# Patient Record
Sex: MALE | Race: WHITE | NOT HISPANIC OR LATINO | Employment: FULL TIME | URBAN - METROPOLITAN AREA
[De-identification: names, ages, dates, MRNs, and addresses within clinical notes are randomized per-mention and may not be internally consistent; named-entity substitution may affect disease eponyms.]

---

## 2022-07-06 ENCOUNTER — OFFICE VISIT (OUTPATIENT)
Dept: INTERNAL MEDICINE | Facility: CLINIC | Age: 47
End: 2022-07-06

## 2022-07-06 ENCOUNTER — PATIENT ROUNDING (BHMG ONLY) (OUTPATIENT)
Dept: INTERNAL MEDICINE | Facility: CLINIC | Age: 47
End: 2022-07-06

## 2022-07-06 VITALS
HEART RATE: 92 BPM | HEIGHT: 72 IN | BODY MASS INDEX: 22.62 KG/M2 | SYSTOLIC BLOOD PRESSURE: 114 MMHG | WEIGHT: 167 LBS | DIASTOLIC BLOOD PRESSURE: 82 MMHG | TEMPERATURE: 97.8 F | OXYGEN SATURATION: 97 %

## 2022-07-06 DIAGNOSIS — F17.210 CIGARETTE NICOTINE DEPENDENCE WITHOUT COMPLICATION: ICD-10-CM

## 2022-07-06 DIAGNOSIS — Z00.00 HEALTH CARE MAINTENANCE: Primary | ICD-10-CM

## 2022-07-06 DIAGNOSIS — R10.11 RUQ ABDOMINAL PAIN: ICD-10-CM

## 2022-07-06 DIAGNOSIS — F41.9 ANXIETY: ICD-10-CM

## 2022-07-06 DIAGNOSIS — K76.9 LIVER DISEASE: ICD-10-CM

## 2022-07-06 PROBLEM — F17.200 NICOTINE ADDICTION: Status: ACTIVE | Noted: 2022-07-06

## 2022-07-06 PROCEDURE — 99386 PREV VISIT NEW AGE 40-64: CPT | Performed by: NURSE PRACTITIONER

## 2022-07-06 RX ORDER — BUPROPION HYDROCHLORIDE 150 MG/1
150 TABLET, EXTENDED RELEASE ORAL 2 TIMES DAILY
Qty: 60 TABLET | Refills: 5 | Status: SHIPPED | OUTPATIENT
Start: 2022-07-06

## 2022-07-06 NOTE — PROGRESS NOTES
July 6, 2022    Patient Rounding obtained at checkout. Patient heard about us from his family. Stated he did not have any problems scheduling his new patient appointment. Patient stated his first appointment went very weill and he would recommend us to family and friends.

## 2022-07-06 NOTE — PATIENT INSTRUCTIONS
1. Preventative counseling- work on healthy diet and exercise   2. Current smoker/nicotine use- needs NRT and will try wellbutrin XL  3. Anxiety /hx of ETOH abuse- will help pt set up with a therapist, discuss sex with them, f/ u in 4 weeks. Quit smoking after 1 week.   4. Liver disease- has not seen GI specialist in about 5-6 years   5. RUQ pain- check US and liver labs

## 2022-07-06 NOTE — PROGRESS NOTES
Subjective   Jesús Ferrer is a 46 y.o. male. New patient  Stomach pain     History of Present Illness   The patient is here today for CPE and lab work F/U. New patient to establish care.    Acute abd pain. Pain started about 3 months ago. It hurts in his RUQ. Its intermittent, non tender to palpation. It can be sharp and radiates to his lateral side. No GERD. The pain occurs sometimes if he is dehydrated, otherwise he is unsure.    He also smokes pot daily to deal anxiety, anger, PTSD. He doesn't want to do that anymore. He had alcoholism and homelessness for years. He reports he was in a coma for a few weeks, had organ failure. His parents planned his . He woke up out of nowhere. He had to learn to walk and talk again. He was down to 115 lbs he thinks. He doesn't remember a lot of this. He went to a  facility to rehab. He still didn't quit drinking. The last time he shot up heroin and OD'd at a [facility]arc stop. He then went to a medical detox and has never done anything since. Was there for over a year. Marijuana started about 1-2 years ago.     He is a  at [employer].     Needs help quitting smoking. He wants to quit tomorrow.     He has not been sexually active for a year. He is able to masturbate and achieve orgasm but is now anxious that he will be unable to perform.     Kids are 8 and 9 (these are not biological kids but his girlfriend's kids), about to be 8 and 10. His oldest dtr is special needs, autistic. She is able to talk now.   His son is 17 years old and lives in [place] with his ex wife.   Sisters are taisha, and ian Ferrer, IBRAHIMA Young Goodwin.   The following portions of the patient's history were reviewed and updated as appropriate: allergies, current medications, past family history, past medical history, past social history, past surgical history and problem list.    Review of Systems   Constitutional: Negative for chills and fever.   HENT: Negative for ear  pain, rhinorrhea and sore throat.    Eyes: Negative.    Respiratory: Negative for cough and shortness of breath.    Cardiovascular: Negative.    Gastrointestinal: Positive for abdominal pain.   Endocrine: Negative for cold intolerance and heat intolerance.   Genitourinary: Negative for decreased urine volume, difficulty urinating, discharge, dysuria, flank pain, frequency, genital sores, hematuria, penile pain, erectile dysfunction, testicular pain and urgency.   Musculoskeletal: Positive for arthralgias.   Skin: Positive for rash (on flexor surfaces, pt reports as a child sensitive skin ).   Allergic/Immunologic: Negative for environmental allergies and food allergies.   Neurological: Negative.    Hematological: Negative.    Psychiatric/Behavioral: Positive for depressed mood. Negative for dysphoric mood and suicidal ideas. The patient is nervous/anxious.        Objective   Physical Exam  Constitutional:       Appearance: Normal appearance. He is well-developed.   HENT:      Right Ear: Hearing, tympanic membrane, ear canal and external ear normal.      Left Ear: Hearing, tympanic membrane, ear canal and external ear normal.      Nose: Nose normal.      Mouth/Throat:      Pharynx: Uvula midline.   Eyes:      General: Lids are normal.      Conjunctiva/sclera: Conjunctivae normal.      Pupils: Pupils are equal, round, and reactive to light.   Neck:      Thyroid: No thyromegaly.      Vascular: No carotid bruit.      Trachea: Trachea normal.   Cardiovascular:      Rate and Rhythm: Normal rate and regular rhythm.      Heart sounds: Normal heart sounds.   Pulmonary:      Effort: Pulmonary effort is normal.      Breath sounds: Normal breath sounds.      Comments: Coarse upper lung sounds, cleared with coughing   Chest:   Breasts:      Right: No supraclavicular adenopathy.      Left: No supraclavicular adenopathy.       Abdominal:      General: Abdomen is flat. Bowel sounds are normal.      Palpations: Abdomen is soft.       Tenderness: There is no abdominal tenderness.   Musculoskeletal:         General: Normal range of motion.      Cervical back: Normal range of motion and neck supple.   Lymphadenopathy:      Cervical: No cervical adenopathy.      Upper Body:      Right upper body: No supraclavicular adenopathy.      Left upper body: No supraclavicular adenopathy.   Skin:     General: Skin is warm and dry.   Neurological:      Mental Status: He is alert and oriented to person, place, and time.      Cranial Nerves: No cranial nerve deficit.      Sensory: No sensory deficit.      Deep Tendon Reflexes:      Reflex Scores:       Patellar reflexes are 2+ on the right side and 2+ on the left side.  Psychiatric:         Speech: Speech normal.         Behavior: Behavior normal.         Thought Content: Thought content normal.         Judgment: Judgment normal.         Vitals:    07/06/22 0913   BP: 114/82   Pulse: 92   Temp: 97.8 °F (36.6 °C)   SpO2: 97%     Body mass index is 22.65 kg/m².      Assessment & Plan   Diagnoses and all orders for this visit:    1. Health care maintenance (Primary)  -     CBC & Differential  -     Comprehensive Metabolic Panel  -     Lipid Panel With LDL / HDL Ratio  -     TSH Rfx On Abnormal To Free T4  -     Vitamin D 25 Hydroxy  -     Hepatitis C Antibody    2. Cigarette nicotine dependence without complication  -     buPROPion SR (Wellbutrin SR) 150 MG 12 hr tablet; Take 1 tablet by mouth 2 (Two) Times a Day.  Dispense: 60 tablet; Refill: 5  -     CBC & Differential  -     Comprehensive Metabolic Panel  -     Lipid Panel With LDL / HDL Ratio  -     TSH Rfx On Abnormal To Free T4  -     Vitamin D 25 Hydroxy  -     Hepatitis C Antibody    3. Anxiety  -     buPROPion SR (Wellbutrin SR) 150 MG 12 hr tablet; Take 1 tablet by mouth 2 (Two) Times a Day.  Dispense: 60 tablet; Refill: 5  -     CBC & Differential  -     Comprehensive Metabolic Panel  -     Lipid Panel With LDL / HDL Ratio  -     TSH Rfx On Abnormal To  Free T4  -     Vitamin D 25 Hydroxy  -     Hepatitis C Antibody    4. Liver disease  -     CBC & Differential  -     Comprehensive Metabolic Panel  -     Lipid Panel With LDL / HDL Ratio  -     TSH Rfx On Abnormal To Free T4  -     Vitamin D 25 Hydroxy  -     Hepatitis C Antibody  -     US Liver; Future    5. RUQ abdominal pain  -     CBC & Differential  -     Comprehensive Metabolic Panel  -     Lipid Panel With LDL / HDL Ratio  -     TSH Rfx On Abnormal To Free T4  -     Vitamin D 25 Hydroxy  -     Hepatitis C Antibody  -     US Liver; Future                 1. Preventative counseling- work on healthy diet and exercise   2. Current smoker/nicotine use- needs NRT and will try wellbutrin XL  3. Anxiety /hx of ETOH abuse- will help pt set up with a therapist, discuss sex with them, f/ u in 4 weeks. Quit smoking after 1 week.   4. Liver disease- has not seen GI specialist in about 5-6 years   5. RUQ pain- check US and liver labs

## 2022-07-08 LAB
25(OH)D3+25(OH)D2 SERPL-MCNC: 34.1 NG/ML (ref 30–100)
ALBUMIN SERPL-MCNC: 4.5 G/DL (ref 4–5)
ALBUMIN/GLOB SERPL: 1.9 {RATIO} (ref 1.2–2.2)
ALP SERPL-CCNC: 46 IU/L (ref 44–121)
ALT SERPL-CCNC: 26 IU/L (ref 0–44)
AST SERPL-CCNC: 34 IU/L (ref 0–40)
BASOPHILS # BLD AUTO: 0 X10E3/UL (ref 0–0.2)
BASOPHILS NFR BLD AUTO: 0 %
BILIRUB SERPL-MCNC: <0.2 MG/DL (ref 0–1.2)
BUN SERPL-MCNC: 14 MG/DL (ref 6–24)
BUN/CREAT SERPL: 19 (ref 9–20)
CALCIUM SERPL-MCNC: 9.2 MG/DL (ref 8.7–10.2)
CHLORIDE SERPL-SCNC: 108 MMOL/L (ref 96–106)
CHOLEST SERPL-MCNC: 156 MG/DL (ref 100–199)
CO2 SERPL-SCNC: 23 MMOL/L (ref 20–29)
CREAT SERPL-MCNC: 0.73 MG/DL (ref 0.76–1.27)
EGFRCR SERPLBLD CKD-EPI 2021: 114 ML/MIN/1.73
EOSINOPHIL # BLD AUTO: 0.6 X10E3/UL (ref 0–0.4)
EOSINOPHIL NFR BLD AUTO: 5 %
ERYTHROCYTE [DISTWIDTH] IN BLOOD BY AUTOMATED COUNT: 13.2 % (ref 11.6–15.4)
GLOBULIN SER CALC-MCNC: 2.4 G/DL (ref 1.5–4.5)
GLUCOSE SERPL-MCNC: 101 MG/DL (ref 65–99)
HCT VFR BLD AUTO: 40.5 % (ref 37.5–51)
HCV AB S/CO SERPL IA: 0.1 S/CO RATIO (ref 0–0.9)
HDLC SERPL-MCNC: 42 MG/DL
HGB BLD-MCNC: 13.4 G/DL (ref 13–17.7)
IMM GRANULOCYTES # BLD AUTO: 0 X10E3/UL (ref 0–0.1)
IMM GRANULOCYTES NFR BLD AUTO: 0 %
LDLC SERPL CALC-MCNC: 100 MG/DL (ref 0–99)
LDLC/HDLC SERPL: 2.4 RATIO (ref 0–3.6)
LYMPHOCYTES # BLD AUTO: 3.2 X10E3/UL (ref 0.7–3.1)
LYMPHOCYTES NFR BLD AUTO: 27 %
MCH RBC QN AUTO: 30.5 PG (ref 26.6–33)
MCHC RBC AUTO-ENTMCNC: 33.1 G/DL (ref 31.5–35.7)
MCV RBC AUTO: 92 FL (ref 79–97)
MONOCYTES # BLD AUTO: 1 X10E3/UL (ref 0.1–0.9)
MONOCYTES NFR BLD AUTO: 8 %
NEUTROPHILS # BLD AUTO: 7.1 X10E3/UL (ref 1.4–7)
NEUTROPHILS NFR BLD AUTO: 60 %
PLATELET # BLD AUTO: 397 X10E3/UL (ref 150–450)
POTASSIUM SERPL-SCNC: 4.6 MMOL/L (ref 3.5–5.2)
PROT SERPL-MCNC: 6.9 G/DL (ref 6–8.5)
RBC # BLD AUTO: 4.4 X10E6/UL (ref 4.14–5.8)
SODIUM SERPL-SCNC: 143 MMOL/L (ref 134–144)
TRIGL SERPL-MCNC: 73 MG/DL (ref 0–149)
TSH SERPL DL<=0.005 MIU/L-ACNC: 1.2 UIU/ML (ref 0.45–4.5)
VLDLC SERPL CALC-MCNC: 14 MG/DL (ref 5–40)
WBC # BLD AUTO: 12 X10E3/UL (ref 3.4–10.8)

## 2022-07-27 ENCOUNTER — HOSPITAL ENCOUNTER (OUTPATIENT)
Dept: ULTRASOUND IMAGING | Facility: HOSPITAL | Age: 47
Discharge: HOME | End: 2022-07-27
Admitting: NURSE PRACTITIONER

## 2022-07-27 DIAGNOSIS — R10.11 RUQ ABDOMINAL PAIN: ICD-10-CM

## 2022-07-27 DIAGNOSIS — K76.9 LIVER DISEASE: ICD-10-CM

## 2022-07-27 PROCEDURE — 76705 ECHO EXAM OF ABDOMEN: CPT

## 2022-08-03 ENCOUNTER — OFFICE VISIT (OUTPATIENT)
Dept: INTERNAL MEDICINE | Facility: CLINIC | Age: 47
End: 2022-08-03

## 2022-08-03 VITALS
OXYGEN SATURATION: 97 % | HEIGHT: 72 IN | WEIGHT: 166.8 LBS | DIASTOLIC BLOOD PRESSURE: 68 MMHG | SYSTOLIC BLOOD PRESSURE: 112 MMHG | TEMPERATURE: 98.2 F | HEART RATE: 67 BPM | BODY MASS INDEX: 22.59 KG/M2

## 2022-08-03 DIAGNOSIS — R10.11 RUQ ABDOMINAL PAIN: ICD-10-CM

## 2022-08-03 DIAGNOSIS — M79.674 PAIN OF TOE OF RIGHT FOOT: ICD-10-CM

## 2022-08-03 DIAGNOSIS — D72.829 LEUKOCYTOSIS, UNSPECIFIED TYPE: Primary | ICD-10-CM

## 2022-08-03 DIAGNOSIS — Z12.11 COLON CANCER SCREENING: ICD-10-CM

## 2022-08-03 DIAGNOSIS — F41.9 ANXIETY: ICD-10-CM

## 2022-08-03 DIAGNOSIS — F17.200 CURRENT EVERY DAY SMOKER: ICD-10-CM

## 2022-08-03 DIAGNOSIS — Z12.2 SCREENING FOR LUNG CANCER: ICD-10-CM

## 2022-08-03 PROCEDURE — 90471 IMMUNIZATION ADMIN: CPT | Performed by: NURSE PRACTITIONER

## 2022-08-03 PROCEDURE — 99214 OFFICE O/P EST MOD 30 MIN: CPT | Performed by: NURSE PRACTITIONER

## 2022-08-03 PROCEDURE — 90677 PCV20 VACCINE IM: CPT | Performed by: NURSE PRACTITIONER

## 2022-08-03 RX ORDER — BUSPIRONE HYDROCHLORIDE 5 MG/1
5 TABLET ORAL 2 TIMES DAILY PRN
Qty: 60 TABLET | Refills: 5 | Status: SHIPPED | OUTPATIENT
Start: 2022-08-03 | End: 2022-08-18 | Stop reason: SDUPTHER

## 2022-08-03 NOTE — PROGRESS NOTES
"Subjective   Jesús Ferrer is a 47 y.o. male.  4 week fu on anxiety     History of Present Illness   The patient is here today to F/U on lab work.     Abd pain- US neg acute, fatty liver noted, the pain continues, it is sporadic. Occurs every few days, may last for a few minutes. He is very active and wonders if it is a muscle tear. It has improved some. He is eating healthier and drinking more water.     Anxiety- pt was started on wellbutrin at last visit. He can let this go a little easier. He still does feel anxious. He has not set up a visit with a therapist.     He had alcoholism and homelessness for years. He reports he was in a coma for a few weeks, had organ failure. His parents planned his . He woke up out of nowhere. He had to learn to walk and talk again. He was down to 115 lbs he thinks. He doesn't remember a lot of this. He went to a  facility to rehab. He still didn't quit drinking. The last time he shot up heroin and OD'd at a [employer] stop. He then went to a medical detox and has never done anything since. Was there for over a year. Marijuana started about 1-2 years ago.     His right toe \"hurts like hell all the time\". It has been going on for years. He wonders if he injured it many years ago with sports. It will swell at times \"a little\" , does not turn red.      He is a  at [employer].   Kids are 8 and 9 (these are not biological kids but his girlfriend's kids), about to be 8 and 10. His oldest dtr is special needs, autistic. She is able to talk now.   His son is 17 years old and lives in [place] with his ex wife.   Sisters are Anaya, and ian Ferrer, IBRAHIMA Vidal Buddy.   The following portions of the patient's history were reviewed and updated as appropriate: allergies, current medications, past family history, past medical history, past social history, past surgical history and problem list.    Review of Systems   Constitutional: Negative for chills and " fever.   Respiratory: Negative.    Cardiovascular: Negative.    Gastrointestinal: Positive for abdominal pain. Negative for abdominal distention, anal bleeding, blood in stool, constipation, diarrhea, nausea, rectal pain, vomiting, GERD and indigestion.   Musculoskeletal: Positive for arthralgias.   Psychiatric/Behavioral: Negative for dysphoric mood and suicidal ideas. The patient is nervous/anxious.        Objective   Physical Exam  Constitutional:       Appearance: Normal appearance. He is well-developed.   Neck:      Thyroid: No thyromegaly.   Cardiovascular:      Rate and Rhythm: Normal rate and regular rhythm.      Heart sounds: Normal heart sounds.   Pulmonary:      Effort: Pulmonary effort is normal.      Breath sounds: Normal breath sounds.   Abdominal:      General: Abdomen is flat.      Palpations: Abdomen is soft.      Tenderness: There is no abdominal tenderness.   Musculoskeletal:      Cervical back: Normal range of motion and neck supple.      Right foot: Normal range of motion and normal capillary refill. Bony tenderness present. Normal pulse.        Legs:       Comments: Right great toe Hallux valgus, tender to palpation, slight erythema noted.    Lymphadenopathy:      Cervical: No cervical adenopathy.   Skin:     General: Skin is warm and dry.   Neurological:      Mental Status: He is alert.   Psychiatric:         Behavior: Behavior normal.         Thought Content: Thought content normal.         Judgment: Judgment normal.         Vitals:    08/03/22 0931   BP: 112/68   Pulse: 67   Temp: 98.2 °F (36.8 °C)   SpO2: 97%     Body mass index is 22.62 kg/m².      Current Outpatient Medications:   •  buPROPion SR (Wellbutrin SR) 150 MG 12 hr tablet, Take 1 tablet by mouth 2 (Two) Times a Day., Disp: 60 tablet, Rfl: 5  •  busPIRone (BUSPAR) 5 MG tablet, Take 1 tablet by mouth 2 (Two) Times a Day As Needed (anxiety)., Disp: 60 tablet, Rfl: 5  Assessment & Plan   Diagnoses and all orders for this  visit:    1. Leukocytosis, unspecified type (Primary)  -     Ambulatory Referral to Hematology    2. Colon cancer screening  -     Ambulatory Referral to Gastroenterology    3. RUQ abdominal pain  -     Ambulatory Referral to Gastroenterology    4. Anxiety  -     busPIRone (BUSPAR) 5 MG tablet; Take 1 tablet by mouth 2 (Two) Times a Day As Needed (anxiety).  Dispense: 60 tablet; Refill: 5    5. Pain of toe of right foot  -     Uric acid  -     C-reactive protein  -     Ambulatory Referral to Podiatry    6. Screening for lung cancer  -     CT Chest Low Dose Wo; Future    7. Current every day smoker  -     CT Chest Low Dose Wo; Future        Office Visit on 07/06/2022   Component Date Value Ref Range Status   • WBC 07/07/2022 12.0 (A) 3.4 - 10.8 x10E3/uL Final   • RBC 07/07/2022 4.40  4.14 - 5.80 x10E6/uL Final   • Hemoglobin 07/07/2022 13.4  13.0 - 17.7 g/dL Final   • Hematocrit 07/07/2022 40.5  37.5 - 51.0 % Final   • MCV 07/07/2022 92  79 - 97 fL Final   • MCH 07/07/2022 30.5  26.6 - 33.0 pg Final   • MCHC 07/07/2022 33.1  31.5 - 35.7 g/dL Final   • RDW 07/07/2022 13.2  11.6 - 15.4 % Final   • Platelets 07/07/2022 397  150 - 450 x10E3/uL Final   • Neutrophil Rel % 07/07/2022 60  Not Estab. % Final   • Lymphocyte Rel % 07/07/2022 27  Not Estab. % Final   • Monocyte Rel % 07/07/2022 8  Not Estab. % Final   • Eosinophil Rel % 07/07/2022 5  Not Estab. % Final   • Basophil Rel % 07/07/2022 0  Not Estab. % Final   • Neutrophils Absolute 07/07/2022 7.1 (A) 1.4 - 7.0 x10E3/uL Final   • Lymphocytes Absolute 07/07/2022 3.2 (A) 0.7 - 3.1 x10E3/uL Final   • Monocytes Absolute 07/07/2022 1.0 (A) 0.1 - 0.9 x10E3/uL Final   • Eosinophils Absolute 07/07/2022 0.6 (A) 0.0 - 0.4 x10E3/uL Final   • Basophils Absolute 07/07/2022 0.0  0.0 - 0.2 x10E3/uL Final   • Immature Granulocyte Rel % 07/07/2022 0  Not Estab. % Final   • Immature Grans Absolute 07/07/2022 0.0  0.0 - 0.1 x10E3/uL Final   • Glucose 07/07/2022 101 (A) 65 - 99 mg/dL  Final   • BUN 07/07/2022 14  6 - 24 mg/dL Final   • Creatinine 07/07/2022 0.73 (A) 0.76 - 1.27 mg/dL Final   • EGFR Result 07/07/2022 114  >59 mL/min/1.73 Final   • BUN/Creatinine Ratio 07/07/2022 19  9 - 20 Final   • Sodium 07/07/2022 143  134 - 144 mmol/L Final   • Potassium 07/07/2022 4.6  3.5 - 5.2 mmol/L Final   • Chloride 07/07/2022 108 (A) 96 - 106 mmol/L Final   • Total CO2 07/07/2022 23  20 - 29 mmol/L Final   • Calcium 07/07/2022 9.2  8.7 - 10.2 mg/dL Final   • Total Protein 07/07/2022 6.9  6.0 - 8.5 g/dL Final   • Albumin 07/07/2022 4.5  4.0 - 5.0 g/dL Final   • Globulin 07/07/2022 2.4  1.5 - 4.5 g/dL Final   • A/G Ratio 07/07/2022 1.9  1.2 - 2.2 Final   • Total Bilirubin 07/07/2022 <0.2  0.0 - 1.2 mg/dL Final   • Alkaline Phosphatase 07/07/2022 46  44 - 121 IU/L Final   • AST (SGOT) 07/07/2022 34  0 - 40 IU/L Final   • ALT (SGPT) 07/07/2022 26  0 - 44 IU/L Final   • Total Cholesterol 07/07/2022 156  100 - 199 mg/dL Final   • Triglycerides 07/07/2022 73  0 - 149 mg/dL Final   • HDL Cholesterol 07/07/2022 42  >39 mg/dL Final   • VLDL Cholesterol Moo 07/07/2022 14  5 - 40 mg/dL Final   • LDL Chol Calc (NIH) 07/07/2022 100 (A) 0 - 99 mg/dL Final   • LDL/HDL RATIO 07/07/2022 2.4  0.0 - 3.6 ratio Final    Comment:                                     LDL/HDL Ratio                                              Men  Women                                1/2 Avg.Risk  1.0    1.5                                    Avg.Risk  3.6    3.2                                 2X Avg.Risk  6.2    5.0                                 3X Avg.Risk  8.0    6.1     • TSH 07/07/2022 1.200  0.450 - 4.500 uIU/mL Final   • 25 Hydroxy, Vitamin D 07/07/2022 34.1  30.0 - 100.0 ng/mL Final    Comment: Vitamin D deficiency has been defined by the [place] and an Endocrine Society practice guideline as a  level of serum 25-OH vitamin D less than 20 ng/mL (1,2).  The Endocrine Society went on to further define vitamin  D  insufficiency as a level between 21 and 29 ng/mL (2).  1. IOM ([place] of Medicine). 2010. Dietary reference     intakes for calcium and D. [place]: The     National Academies Press.  2. Elena ESPINO, Rafy SALINAS, Emre DOMÍNGUEZ, et al.     Evaluation, treatment, and prevention of vitamin D     deficiency: an Endocrine Society clinical practice     guideline. JCEM. 2011 Jul; 96(7):1911-30.     • Hep C Virus Ab 07/07/2022 0.1  0.0 - 0.9 s/co ratio Final    Comment:                                   Negative:     < 0.8                               Indeterminate: 0.8 - 0.9                                    Positive:     > 0.9   HCV antibody alone does not differentiate between   previous resolved infection and active infection.   The CDC and current clinical guidelines recommend   that a positive HCV antibody result be followed up   with an HCV RNA test to support the diagnosis of   acute HCV infection. [facility] offers Hepatitis C   Virus (HCV) RNA, Diagnosis, ARMANDO (247654) and   Hepatitis C Virus (HCV) Antibody with reflex to   Quantitative Real-time PCR (894376).            1. Leukocytosis- likely due to smoking, suggest he see hematology   2. Anxiety- continue wellbutrin, ok to try buspar, suggest therapist and exercise   4. Toe pain/Hallux valgus- check uric acid, see podiatrist   5. RUQ pain- GB with minimal sludge and mild fatty liver, exam benign today, see GI, could do HIDA but not with a lot of other GI symptoms, he will discuss with GI spec  6. Lung cancer screening- discussed options, he would like screening     “Discussed risks/benefits to vaccination, reviewed components of the vaccine, discussed VIS, discussed informed consent, informed consent obtained. Patient/Parent was allowed to accept or refuse vaccine. Questions answered to satisfactory state of patient/Parent. We reviewed typical age appropriate and seasonally appropriate vaccinations. Reviewed immunization history and updated state  vaccination form as needed. Patient was counseled on Prevnar 20

## 2022-08-03 NOTE — PATIENT INSTRUCTIONS
1. Leukocytosis- likely due to smoking, suggest he see hematology   2. Anxiety- continue wellbutrin, ok to try buspar, suggest therapist and exercise   4. Toe pain/Hallux valgus- check uric acid, see podiatrist   5. RUQ pain- GB with minimal sludge and mild fatty liver, exam benign today, see GI, could do HIDA but not with a lot of other GI symptoms, he will discuss with GI spec  6. Lung cancer screening- discussed options, he would like screening

## 2022-08-09 ENCOUNTER — TELEPHONE (OUTPATIENT)
Dept: INTERNAL MEDICINE | Facility: CLINIC | Age: 47
End: 2022-08-09

## 2022-08-18 ENCOUNTER — CONSULT (OUTPATIENT)
Dept: ONCOLOGY | Facility: CLINIC | Age: 47
End: 2022-08-18

## 2022-08-18 ENCOUNTER — LAB (OUTPATIENT)
Dept: OTHER | Facility: HOSPITAL | Age: 47
End: 2022-08-18

## 2022-08-18 VITALS
HEART RATE: 55 BPM | OXYGEN SATURATION: 98 % | TEMPERATURE: 97.1 F | HEIGHT: 72 IN | SYSTOLIC BLOOD PRESSURE: 125 MMHG | RESPIRATION RATE: 16 BRPM | DIASTOLIC BLOOD PRESSURE: 78 MMHG | WEIGHT: 168.3 LBS | BODY MASS INDEX: 22.8 KG/M2

## 2022-08-18 DIAGNOSIS — F41.9 ANXIETY: ICD-10-CM

## 2022-08-18 DIAGNOSIS — Z12.11 SCREENING FOR COLON CANCER: ICD-10-CM

## 2022-08-18 DIAGNOSIS — D72.829 LEUKOCYTOSIS, UNSPECIFIED TYPE: Primary | ICD-10-CM

## 2022-08-18 DIAGNOSIS — F17.200 CURRENT EVERY DAY SMOKER: ICD-10-CM

## 2022-08-18 LAB
BASOPHILS # BLD AUTO: 0.06 10*3/MM3 (ref 0–0.2)
BASOPHILS NFR BLD AUTO: 0.4 % (ref 0–1.5)
CRP SERPL-MCNC: <0.3 MG/DL (ref 0–0.5)
DEPRECATED RDW RBC AUTO: 46 FL (ref 37–54)
EOSINOPHIL # BLD AUTO: 0.44 10*3/MM3 (ref 0–0.4)
EOSINOPHIL NFR BLD AUTO: 2.9 % (ref 0.3–6.2)
ERYTHROCYTE [DISTWIDTH] IN BLOOD BY AUTOMATED COUNT: 13.6 % (ref 12.3–15.4)
ERYTHROCYTE [SEDIMENTATION RATE] IN BLOOD: 5 MM/HR (ref 0–15)
HCT VFR BLD AUTO: 39.5 % (ref 37.5–51)
HGB BLD-MCNC: 13.1 G/DL (ref 13–17.7)
IMM GRANULOCYTES # BLD AUTO: 0.06 10*3/MM3 (ref 0–0.05)
IMM GRANULOCYTES NFR BLD AUTO: 0.4 % (ref 0–0.5)
LYMPHOCYTES # BLD AUTO: 3.69 10*3/MM3 (ref 0.7–3.1)
LYMPHOCYTES NFR BLD AUTO: 24.5 % (ref 19.6–45.3)
MCH RBC QN AUTO: 30.5 PG (ref 26.6–33)
MCHC RBC AUTO-ENTMCNC: 33.2 G/DL (ref 31.5–35.7)
MCV RBC AUTO: 92.1 FL (ref 79–97)
MONOCYTES # BLD AUTO: 1.14 10*3/MM3 (ref 0.1–0.9)
MONOCYTES NFR BLD AUTO: 7.6 % (ref 5–12)
NEUTROPHILS NFR BLD AUTO: 64.2 % (ref 42.7–76)
NEUTROPHILS NFR BLD AUTO: 9.7 10*3/MM3 (ref 1.7–7)
NRBC BLD AUTO-RTO: 0 /100 WBC (ref 0–0.2)
PLATELET # BLD AUTO: 246 10*3/MM3 (ref 140–450)
PMV BLD AUTO: 11.4 FL (ref 6–12)
RBC # BLD AUTO: 4.29 10*6/MM3 (ref 4.14–5.8)
WBC NRBC COR # BLD: 15.09 10*3/MM3 (ref 3.4–10.8)

## 2022-08-18 PROCEDURE — 85652 RBC SED RATE AUTOMATED: CPT | Performed by: INTERNAL MEDICINE

## 2022-08-18 PROCEDURE — 88185 FLOWCYTOMETRY/TC ADD-ON: CPT

## 2022-08-18 PROCEDURE — 85025 COMPLETE CBC W/AUTO DIFF WBC: CPT | Performed by: INTERNAL MEDICINE

## 2022-08-18 PROCEDURE — 88184 FLOWCYTOMETRY/ TC 1 MARKER: CPT

## 2022-08-18 PROCEDURE — 99204 OFFICE O/P NEW MOD 45 MIN: CPT | Performed by: INTERNAL MEDICINE

## 2022-08-18 PROCEDURE — 88182 CELL MARKER STUDY: CPT

## 2022-08-18 PROCEDURE — 36415 COLL VENOUS BLD VENIPUNCTURE: CPT

## 2022-08-18 PROCEDURE — 86140 C-REACTIVE PROTEIN: CPT | Performed by: INTERNAL MEDICINE

## 2022-08-18 NOTE — PROGRESS NOTES
Subjective   Jesús Ferrer is a 47 y.o. male.  Referred by Mone Hutson for evaluation and management of leukocytosis    History of Present Illness   Patient is a 47-year-old  male with previous history of alcohol and drug abuse but has been sober for several years.  He has been referred by his primary care provider for further evaluation and management of leukocytosis.  He does have history of tobacco use and currently smokes about half a pack per day.  He is trying to quit and currently on Wellbutrin for the same.  Denies any frequent infections, fevers, chills, lymphadenopathy.  He had a recent ultrasound of the liver which showed hepatic steatosis but no other abnormalities.  Denies any recent weight loss.  Family history significant for breast cancer and lung cancer in his grandparents.  He has 2 siblings who are healthy.  Denies any poor dentition.  He has had a rash on his elbows and knees for the past few weeks.  Diagnosed as eczema by his primary care provider.    The following portions of the patient's history were reviewed and updated as appropriate: allergies, current medications, past family history, past medical history, past social history, past surgical history and problem list.    Past Medical History:   Diagnosis Date   • Substance abuse (HCC)         Past Surgical History:   Procedure Laterality Date   • COLONOSCOPY          Family History   Problem Relation Age of Onset   • No Known Problems Mother    • No Known Problems Father    • No Known Problems Sister    • No Known Problems Sister    • Dementia Maternal Grandmother    • No Known Problems Maternal Grandfather    • Breast cancer Paternal Grandmother    • Throat cancer Paternal Grandfather    • No Known Problems Son         Social History     Socioeconomic History   • Marital status:    • Number of children: 1   Tobacco Use   • Smoking status: Current Every Day Smoker     Packs/day: 0.75     Years: 30.00     Pack years: 22.50  "    Types: Cigarettes     Start date: 2/17/1994   • Smokeless tobacco: Current User     Types: Chew   • Tobacco comment: Also chews tobacco   Vaping Use   • Vaping Use: Never used   Substance and Sexual Activity   • Alcohol use: Not Currently   • Drug use: Not Currently     Types: Marijuana     Comment: daily   • Sexual activity: Not Currently     Partners: Female             No Known Allergies         Review of Systems   Constitutional: Negative.    HENT: Negative.    Eyes: Negative.    Respiratory: Negative.    Cardiovascular: Negative.    Gastrointestinal: Negative.    Endocrine: Negative.    Genitourinary: Negative.    Skin: Positive for rash.   Allergic/Immunologic: Negative.    Neurological: Negative.    Hematological: Negative.    Psychiatric/Behavioral: Negative.          Objective   Blood pressure 125/78, pulse 55, temperature 97.1 °F (36.2 °C), temperature source Temporal, resp. rate 16, height 182.6 cm (71.89\"), weight 76.3 kg (168 lb 4.8 oz), SpO2 98 %.   Physical Exam  Vitals reviewed.   Constitutional:       Appearance: Normal appearance. He is normal weight.   HENT:      Head: Normocephalic and atraumatic.      Right Ear: External ear normal.      Left Ear: External ear normal.      Nose: Nose normal.      Mouth/Throat:      Mouth: Mucous membranes are moist.   Eyes:      Extraocular Movements: Extraocular movements intact.      Pupils: Pupils are equal, round, and reactive to light.   Cardiovascular:      Rate and Rhythm: Normal rate and regular rhythm.      Pulses: Normal pulses.   Pulmonary:      Effort: Pulmonary effort is normal.   Abdominal:      General: Abdomen is flat.   Musculoskeletal:      Cervical back: Normal range of motion.   Skin:     General: Skin is warm.   Neurological:      General: No focal deficit present.      Mental Status: He is alert.   Psychiatric:         Mood and Affect: Mood normal.         Behavior: Behavior normal.         Thought Content: Thought content normal.       "   Judgment: Judgment normal.         Lab on 08/18/2022   Component Date Value Ref Range Status   • WBC 08/18/2022 15.09 (A) 3.40 - 10.80 10*3/mm3 Final   • RBC 08/18/2022 4.29  4.14 - 5.80 10*6/mm3 Final   • Hemoglobin 08/18/2022 13.1  13.0 - 17.7 g/dL Final   • Hematocrit 08/18/2022 39.5  37.5 - 51.0 % Final   • MCV 08/18/2022 92.1  79.0 - 97.0 fL Final   • MCH 08/18/2022 30.5  26.6 - 33.0 pg Final   • MCHC 08/18/2022 33.2  31.5 - 35.7 g/dL Final   • RDW 08/18/2022 13.6  12.3 - 15.4 % Final   • RDW-SD 08/18/2022 46.0  37.0 - 54.0 fl Final   • MPV 08/18/2022 11.4  6.0 - 12.0 fL Final   • Platelets 08/18/2022 246  140 - 450 10*3/mm3 Final   • Neutrophil % 08/18/2022 64.2  42.7 - 76.0 % Final   • Lymphocyte % 08/18/2022 24.5  19.6 - 45.3 % Final   • Monocyte % 08/18/2022 7.6  5.0 - 12.0 % Final   • Eosinophil % 08/18/2022 2.9  0.3 - 6.2 % Final   • Basophil % 08/18/2022 0.4  0.0 - 1.5 % Final   • Immature Grans % 08/18/2022 0.4  0.0 - 0.5 % Final   • Neutrophils, Absolute 08/18/2022 9.70 (A) 1.70 - 7.00 10*3/mm3 Final   • Lymphocytes, Absolute 08/18/2022 3.69 (A) 0.70 - 3.10 10*3/mm3 Final   • Monocytes, Absolute 08/18/2022 1.14 (A) 0.10 - 0.90 10*3/mm3 Final   • Eosinophils, Absolute 08/18/2022 0.44 (A) 0.00 - 0.40 10*3/mm3 Final   • Basophils, Absolute 08/18/2022 0.06  0.00 - 0.20 10*3/mm3 Final   • Immature Grans, Absolute 08/18/2022 0.06 (A) 0.00 - 0.05 10*3/mm3 Final   • nRBC 08/18/2022 0.0  0.0 - 0.2 /100 WBC Final        US Liver    Result Date: 7/27/2022   1.  Mild hepatic steatosis. 2.  Minimal biliary sludge. No biliary ductal dilatation.  This report was finalized on 7/27/2022 9:12 AM by Dr. Tesha Mcleod M.D.           Assessment & Plan       *Leukocytosis  · Predominant neutrophilia  · Lymphocyte count only mildly elevated  · Increased monocyte count  · Most likely reactive to smoking   · Given leukocytosis and history of smoking I think it is important to rule out any underlying lung malignancy.  Will  obtain low-dose screening CT.  · Also obtain flow cytometry to rule out any underlying bone marrow disorders.    · Refer to GI for screening colonoscopy as he is 47    *Smoking cessation-continue Wellbutrin.    *Follow-up-3 weeks

## 2022-08-19 LAB — REF LAB TEST METHOD: NORMAL

## 2022-08-19 RX ORDER — BUSPIRONE HYDROCHLORIDE 5 MG/1
5 TABLET ORAL 2 TIMES DAILY PRN
Qty: 60 TABLET | Refills: 5 | Status: SHIPPED | OUTPATIENT
Start: 2022-08-19

## 2022-08-22 ENCOUNTER — PRE-PROCEDURE SCREENING (OUTPATIENT)
Dept: GASTROENTEROLOGY | Facility: CLINIC | Age: 47
End: 2022-08-22

## 2022-09-13 ENCOUNTER — APPOINTMENT (OUTPATIENT)
Dept: CT IMAGING | Facility: HOSPITAL | Age: 47
End: 2022-09-13

## 2022-09-15 ENCOUNTER — LAB (OUTPATIENT)
Dept: OTHER | Facility: HOSPITAL | Age: 47
End: 2022-09-15

## 2022-09-15 ENCOUNTER — OFFICE VISIT (OUTPATIENT)
Dept: ONCOLOGY | Facility: CLINIC | Age: 47
End: 2022-09-15

## 2022-09-15 VITALS
HEIGHT: 72 IN | TEMPERATURE: 97.3 F | BODY MASS INDEX: 22.77 KG/M2 | WEIGHT: 168.1 LBS | HEART RATE: 77 BPM | DIASTOLIC BLOOD PRESSURE: 71 MMHG | RESPIRATION RATE: 16 BRPM | SYSTOLIC BLOOD PRESSURE: 116 MMHG | OXYGEN SATURATION: 96 %

## 2022-09-15 DIAGNOSIS — D72.829 LEUKOCYTOSIS, UNSPECIFIED TYPE: Primary | ICD-10-CM

## 2022-09-15 PROCEDURE — 99214 OFFICE O/P EST MOD 30 MIN: CPT | Performed by: INTERNAL MEDICINE

## 2022-09-15 NOTE — PROGRESS NOTES
Subjective   Jesús Ferrer is a 47 y.o. male.  Referred by Mone Hutson for evaluation and management of leukocytosis    History of Present Illness   Patient is a 47-year-old  male with previous history of alcohol and drug abuse but has been sober for several years.  He has been referred by his primary care provider for further evaluation and management of leukocytosis.  He does have history of tobacco use and currently smokes about half a pack per day.  He is trying to quit and currently on Wellbutrin for the same.  Denies any frequent infections, fevers, chills, lymphadenopathy.  He had a recent ultrasound of the liver which showed hepatic steatosis but no other abnormalities.  Denies any recent weight loss.  Family history significant for breast cancer and lung cancer in his grandparents.  He has 2 siblings who are healthy.  Denies any poor dentition.  He has had a rash on his elbows and knees for the past few weeks.  Diagnosed as eczema by his primary care provider.    Interval history  Mr. Girard presents to the clinic today for follow-up.  He missed appointment for screening lung CT as he had to work that day.  Continues to smoke about half pack per day.  Denies any new complaints at this time.  Vital signs reviewed and stable.    The following portions of the patient's history were reviewed and updated as appropriate: allergies, current medications, past family history, past medical history, past social history, past surgical history and problem list.    Past Medical History:   Diagnosis Date   • Substance abuse (HCC)         Past Surgical History:   Procedure Laterality Date   • COLONOSCOPY          Family History   Problem Relation Age of Onset   • No Known Problems Mother    • No Known Problems Father    • No Known Problems Sister    • No Known Problems Sister    • Dementia Maternal Grandmother    • No Known Problems Maternal Grandfather    • Breast cancer Paternal Grandmother    • Throat cancer  "Paternal Grandfather    • No Known Problems Son         Social History     Socioeconomic History   • Marital status: Significant Other   • Number of children: 1   Tobacco Use   • Smoking status: Current Every Day Smoker     Packs/day: 0.75     Years: 30.00     Pack years: 22.50     Types: Cigarettes     Start date: 2/17/1994   • Smokeless tobacco: Current User     Types: Chew   • Tobacco comment: Also chews tobacco   Vaping Use   • Vaping Use: Never used   Substance and Sexual Activity   • Alcohol use: Not Currently   • Drug use: Not Currently     Types: Marijuana     Comment: daily   • Sexual activity: Not Currently     Partners: Female             No Known Allergies         Review of Systems   Constitutional: Negative.    HENT: Negative.    Eyes: Negative.    Respiratory: Negative.    Cardiovascular: Negative.    Gastrointestinal: Negative.    Endocrine: Negative.    Genitourinary: Negative.    Skin: Positive for rash.   Allergic/Immunologic: Negative.    Neurological: Negative.    Hematological: Negative.    Psychiatric/Behavioral: Negative.      Review of systems as mentioned in the HPI and above    Objective   Blood pressure 116/71, pulse 77, temperature 97.3 °F (36.3 °C), temperature source Temporal, resp. rate 16, height 182.6 cm (71.89\"), weight 76.2 kg (168 lb 1.6 oz), SpO2 96 %.   Physical Exam  Vitals reviewed.   Constitutional:       Appearance: Normal appearance. He is normal weight.   HENT:      Head: Normocephalic and atraumatic.      Right Ear: External ear normal.      Left Ear: External ear normal.      Nose: Nose normal.      Mouth/Throat:      Mouth: Mucous membranes are moist.   Eyes:      Extraocular Movements: Extraocular movements intact.      Pupils: Pupils are equal, round, and reactive to light.   Cardiovascular:      Rate and Rhythm: Normal rate and regular rhythm.      Pulses: Normal pulses.   Pulmonary:      Effort: Pulmonary effort is normal.   Abdominal:      General: Abdomen is flat. "   Musculoskeletal:      Cervical back: Normal range of motion.   Skin:     General: Skin is warm.   Neurological:      General: No focal deficit present.      Mental Status: He is alert.   Psychiatric:         Mood and Affect: Mood normal.         Behavior: Behavior normal.         Thought Content: Thought content normal.         Judgment: Judgment normal.       I have reexamined the patient and the results are consistent with the previously documented exam. Estephanie Echols MD     Lab on 08/18/2022   Component Date Value Ref Range Status   • WBC 08/18/2022 15.09 (A) 3.40 - 10.80 10*3/mm3 Final   • RBC 08/18/2022 4.29  4.14 - 5.80 10*6/mm3 Final   • Hemoglobin 08/18/2022 13.1  13.0 - 17.7 g/dL Final   • Hematocrit 08/18/2022 39.5  37.5 - 51.0 % Final   • MCV 08/18/2022 92.1  79.0 - 97.0 fL Final   • MCH 08/18/2022 30.5  26.6 - 33.0 pg Final   • MCHC 08/18/2022 33.2  31.5 - 35.7 g/dL Final   • RDW 08/18/2022 13.6  12.3 - 15.4 % Final   • RDW-SD 08/18/2022 46.0  37.0 - 54.0 fl Final   • MPV 08/18/2022 11.4  6.0 - 12.0 fL Final   • Platelets 08/18/2022 246  140 - 450 10*3/mm3 Final   • Neutrophil % 08/18/2022 64.2  42.7 - 76.0 % Final   • Lymphocyte % 08/18/2022 24.5  19.6 - 45.3 % Final   • Monocyte % 08/18/2022 7.6  5.0 - 12.0 % Final   • Eosinophil % 08/18/2022 2.9  0.3 - 6.2 % Final   • Basophil % 08/18/2022 0.4  0.0 - 1.5 % Final   • Immature Grans % 08/18/2022 0.4  0.0 - 0.5 % Final   • Neutrophils, Absolute 08/18/2022 9.70 (A) 1.70 - 7.00 10*3/mm3 Final   • Lymphocytes, Absolute 08/18/2022 3.69 (A) 0.70 - 3.10 10*3/mm3 Final   • Monocytes, Absolute 08/18/2022 1.14 (A) 0.10 - 0.90 10*3/mm3 Final   • Eosinophils, Absolute 08/18/2022 0.44 (A) 0.00 - 0.40 10*3/mm3 Final   • Basophils, Absolute 08/18/2022 0.06  0.00 - 0.20 10*3/mm3 Final   • Immature Grans, Absolute 08/18/2022 0.06 (A) 0.00 - 0.05 10*3/mm3 Final   • nRBC 08/18/2022 0.0  0.0 - 0.2 /100 WBC Final   • Sed Rate 08/18/2022 5  0 - 15 mm/hr Final   •  C-Reactive Protein 08/18/2022 <0.30  0.00 - 0.50 mg/dL Final   • Reference Lab Report 08/18/2022 Flow Cytometry,Blood   Final        No radiology results for the last 30 days.     8/18/2022 flow cytometry negative    CRP normal at less than 0.3   ESR normal at 5  Hepatitis antibody negative    Assessment & Plan       *Leukocytosis  · Predominant neutrophilia  · Lymphocyte count only mildly elevated  · Increased monocyte count  · Most likely reactive to smoking   · ESR and CRP normal  · Hepatitis antibody negative  · Flow cytometry negative  · Recommend screening lung CT however patient will see appointment  · Reschedule screening lung CT  · If CT negative then plan to repeat CBC in 4 to 6 months.  If the leukocytosis continues to worsen we will obtain a BCR-ABL.    *History of tobacco use  · Obtain screening lung CT    *Smoking cessation-continue Wellbutrin.  He continues to smoke about half pack per day.  We had a lengthy discussion regarding smoking contributing to the elevated white blood cell count.  We also discussed the risk of cancer associated with smoking.  Discussed the increased risk of coronary artery disease  He is willing to try nicotine patches and gums in addition to Wellbutrin.    *Follow-up-4 months

## 2022-11-30 NOTE — ADDENDUM NOTE
Addended by: TITO KNIGHT on: 8/18/2022 12:44 PM     Modules accepted: Orders     Please update referral as it appears it is pending

## 2022-12-13 ENCOUNTER — APPOINTMENT (OUTPATIENT)
Dept: LAB | Facility: HOSPITAL | Age: 47
End: 2022-12-13

## 2023-09-14 ENCOUNTER — OFFICE VISIT (OUTPATIENT)
Dept: INTERNAL MEDICINE | Facility: CLINIC | Age: 48
End: 2023-09-14
Payer: COMMERCIAL

## 2023-09-14 VITALS
TEMPERATURE: 97.9 F | DIASTOLIC BLOOD PRESSURE: 74 MMHG | HEART RATE: 73 BPM | SYSTOLIC BLOOD PRESSURE: 126 MMHG | OXYGEN SATURATION: 99 % | HEIGHT: 72 IN | WEIGHT: 158.6 LBS | BODY MASS INDEX: 21.48 KG/M2

## 2023-09-14 DIAGNOSIS — L23.9 ALLERGIC CONTACT DERMATITIS, UNSPECIFIED TRIGGER: Primary | ICD-10-CM

## 2023-09-14 DIAGNOSIS — F32.A ANXIETY AND DEPRESSION: ICD-10-CM

## 2023-09-14 DIAGNOSIS — Z12.11 COLON CANCER SCREENING: ICD-10-CM

## 2023-09-14 DIAGNOSIS — F41.9 ANXIETY AND DEPRESSION: ICD-10-CM

## 2023-09-14 PROCEDURE — 99214 OFFICE O/P EST MOD 30 MIN: CPT | Performed by: NURSE PRACTITIONER

## 2023-09-14 RX ORDER — METHYLPREDNISOLONE 4 MG/1
TABLET ORAL
Qty: 21 EACH | Refills: 0 | Status: SHIPPED | OUTPATIENT
Start: 2023-09-14

## 2023-09-14 RX ORDER — ESCITALOPRAM OXALATE 10 MG/1
10 TABLET ORAL DAILY
Qty: 30 TABLET | Refills: 6 | Status: SHIPPED | OUTPATIENT
Start: 2023-09-14

## 2023-09-14 RX ORDER — TRIAMCINOLONE ACETONIDE 1 MG/G
1 CREAM TOPICAL 2 TIMES DAILY
Qty: 80 G | Refills: 0 | Status: SHIPPED | OUTPATIENT
Start: 2023-09-14

## 2023-09-14 NOTE — PROGRESS NOTES
"Subjective   Jesús Ferrer is a 48 y.o. male.     History of Present Illness    The patient is here today with c/o rash for many years. Mainly was around belt buckle area so pt avoided all jewelry. Now it has spread to buttocks and the small of his back. Very itchy and irritating. He also notes some red bumps on the inside of his knee cap. He will also notice some dryness and scaling on knees and elbows.   \"I've always had very sensitive skin.\"  He is taking benadryl every morning and hydrocortisone 1-2 times a day.     Has never had derm eval.     Has anxiety, would consider medication. Smokes weed instead.     Wife also sick recently, woke up feeling a little off.     Dad with recent V -fib episode, now has a pacer.   The following portions of the patient's history were reviewed and updated as appropriate: allergies, current medications, past family history, past medical history, past social history, past surgical history and problem list.    Review of Systems   Constitutional: Negative.    Respiratory:  Positive for cough (a little bit this am). Negative for shortness of breath and wheezing.    Cardiovascular: Negative.    Skin:  Positive for dry skin and rash.   Psychiatric/Behavioral:  Positive for depressed mood. Negative for suicidal ideas. The patient is nervous/anxious.      Objective   Physical Exam  Constitutional:       Appearance: Normal appearance. He is well-developed.   HENT:      Right Ear: Hearing, tympanic membrane, ear canal and external ear normal. A middle ear effusion is present.      Left Ear: Hearing, tympanic membrane, ear canal and external ear normal. There is impacted cerumen.   Neck:      Thyroid: No thyromegaly.   Cardiovascular:      Rate and Rhythm: Normal rate and regular rhythm.      Heart sounds: Normal heart sounds.   Pulmonary:      Effort: Pulmonary effort is normal.      Breath sounds: Normal breath sounds.   Musculoskeletal:      Cervical back: Normal range of motion and " neck supple.   Lymphadenopathy:      Cervical: No cervical adenopathy.   Skin:     General: Skin is warm and dry.   Neurological:      Mental Status: He is alert.   Psychiatric:         Behavior: Behavior normal.         Thought Content: Thought content normal.         Judgment: Judgment normal.       Vitals:    09/14/23 1604   BP: 126/74   Pulse: 73   Temp: 97.9 °F (36.6 °C)   SpO2: 99%     Body mass index is 21.58 kg/m².      Assessment & Plan   Diagnoses and all orders for this visit:    1. Allergic contact dermatitis, unspecified trigger (Primary)  -     Ambulatory Referral to Dermatology  -     methylPREDNISolone (MEDROL) 4 MG dose pack; Take as directed on package instructions.  Dispense: 21 each; Refill: 0  -     triamcinolone (KENALOG) 0.1 % cream; Apply 1 application  topically to the appropriate area as directed 2 (Two) Times a Day.  Dispense: 80 g; Refill: 0    2. Anxiety and depression  -     escitalopram (Lexapro) 10 MG tablet; Take 1 tablet by mouth Daily.  Dispense: 30 tablet; Refill: 6    3. Colon cancer screening  -     Ambulatory Referral For Screening Colonoscopy                 1. Contact dermatitis- treat with medrol dose pack, place derm referral   2. Anxiety and depression- try lexapro, aware of dosing and SEs, eval in 4 wks.

## 2023-12-28 DIAGNOSIS — F41.9 ANXIETY: ICD-10-CM

## 2023-12-28 DIAGNOSIS — F17.210 CIGARETTE NICOTINE DEPENDENCE WITHOUT COMPLICATION: ICD-10-CM

## 2023-12-28 RX ORDER — BUPROPION HYDROCHLORIDE 150 MG/1
150 TABLET, EXTENDED RELEASE ORAL 2 TIMES DAILY
Qty: 60 TABLET | Refills: 5 | OUTPATIENT
Start: 2023-12-28

## 2025-06-09 ENCOUNTER — OFFICE VISIT (OUTPATIENT)
Dept: INTERNAL MEDICINE | Facility: CLINIC | Age: 50
End: 2025-06-09
Payer: COMMERCIAL

## 2025-06-09 VITALS
OXYGEN SATURATION: 98 % | HEIGHT: 72 IN | HEART RATE: 60 BPM | WEIGHT: 157.5 LBS | SYSTOLIC BLOOD PRESSURE: 102 MMHG | BODY MASS INDEX: 21.33 KG/M2 | DIASTOLIC BLOOD PRESSURE: 62 MMHG

## 2025-06-09 DIAGNOSIS — D72.829 LEUKOCYTOSIS, UNSPECIFIED TYPE: ICD-10-CM

## 2025-06-09 DIAGNOSIS — F17.210 CIGARETTE NICOTINE DEPENDENCE WITHOUT COMPLICATION: ICD-10-CM

## 2025-06-09 DIAGNOSIS — Z00.00 HEALTH CARE MAINTENANCE: Primary | ICD-10-CM

## 2025-06-09 DIAGNOSIS — M25.552 LEFT HIP PAIN: ICD-10-CM

## 2025-06-09 DIAGNOSIS — R21 RASH: ICD-10-CM

## 2025-06-09 DIAGNOSIS — R09.89 RHONCHI: ICD-10-CM

## 2025-06-09 DIAGNOSIS — Z12.11 COLON CANCER SCREENING: ICD-10-CM

## 2025-06-09 PROCEDURE — 99396 PREV VISIT EST AGE 40-64: CPT | Performed by: NURSE PRACTITIONER

## 2025-06-09 RX ORDER — VARENICLINE TARTRATE 0.5 (11)-1
KIT ORAL
Qty: 1 EACH | Refills: 0 | Status: SHIPPED | OUTPATIENT
Start: 2025-06-09 | End: 2025-07-07

## 2025-06-09 RX ORDER — VARENICLINE TARTRATE 1 MG/1
1 TABLET, FILM COATED ORAL 2 TIMES DAILY
Qty: 56 TABLET | Refills: 6 | Status: SHIPPED | OUTPATIENT
Start: 2025-07-07 | End: 2026-01-19

## 2025-06-09 NOTE — PATIENT INSTRUCTIONS
1. Preventative counseling- continue to eat healthy and exercise   2. Current every day smoker- encouraged pt to quit smoke, he will try chantix, aware of dosing and SEs  3. Left hip pain - see sports med   4. Rash- will re place derm referral if this becomes irritating pt will call and will send in medrol dose pack   ?hidradenitis  suppurativa   5. Coarse rhonchi- check CXR, stop smoking   6. Leukocytosis- if elevated, he will quit smoking and we will recheck 6 wks later.     Grief, suggest counseling.

## 2025-06-09 NOTE — PROGRESS NOTES
Subjective   Jesús Ferrer is a 49 y.o. male.     History of Present Illness   The patient is here today for CPE and lab work F/U. He is feeling well. He is exercising and eating healthy. He has a good support group regarding mom's death.   He likes garden a lot.     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 He has a lot of joint pain. It hurts anteriorly and can be severe at times. He is on his feet all day. Standing can really aggravate.     BMI is within normal parameters. No other follow-up for BMI required.    -c/o rash for many years. Mainly was around belt buckle area so pt avoided all jewelry. Now it has spread to buttocks and the small of his back. Very itchy and irritating   - he did take the medrol dose pack and it went away for about a week. Came back- not as irritating, worse with heat   Did not see derm      He helps raise his GFs kids 12 and 13. He is not in contact with his son. Youngest is autistic.   Mom  of cancer in January.   He had alcoholism and homelessness for years. He reports he was in a coma for a few weeks, had organ failure. His parents planned his . He woke up out of nowhere. He had to learn to walk and talk again. He was down to 115 lbs he thinks. He doesn't remember a lot of this. He went to a  facility to rehab.   Runs a [employer].   The following portions of the patient's history were reviewed and updated as appropriate: allergies, current medications, past family history, past medical history, past social history, past surgical history and problem list.    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 Negative for chills and fever.   HENT:  Negative for ear pain, rhinorrhea and sore throat.    Eyes: Negative.    Respiratory:  Negative for cough and shortness of breath.    Cardiovascular: Negative.    Gastrointestinal: Negative.    Endocrine: Negative for cold intolerance and heat intolerance.   Genitourinary:  Negative for  decreased urine volume, difficulty urinating, discharge, dysuria, flank pain, frequency, genital sores, hematuria, penile pain, erectile dysfunction, testicular pain and urgency.   Musculoskeletal:  Positive for arthralgias (left hip pain.).   Skin:  Positive for rash.   Allergic/Immunologic: Negative for environmental allergies and food allergies.   Neurological: Negative.    Hematological: Negative.    Psychiatric/Behavioral:  Negative for dysphoric mood and suicidal ideas. The patient is nervous/anxious (at baseline).        Objective   Physical Exam  Constitutional:       Appearance: Normal appearance. He is well-developed.   HENT:      Right Ear: Hearing, tympanic membrane, ear canal and external ear normal.      Left Ear: Hearing, tympanic membrane, ear canal and external ear normal.   Eyes:      General: Lids are normal.      Conjunctiva/sclera: Conjunctivae normal.      Pupils: Pupils are equal, round, and reactive to light.   Neck:      Thyroid: No thyromegaly.      Vascular: No carotid bruit.      Trachea: Trachea normal.   Cardiovascular:      Rate and Rhythm: Normal rate and regular rhythm.      Heart sounds: Normal heart sounds.   Pulmonary:      Effort: Pulmonary effort is normal.      Breath sounds: Examination of the right-upper field reveals rhonchi. Examination of the left-upper field reveals rhonchi. Examination of the right-middle field reveals rhonchi. Examination of the left-middle field reveals rhonchi. Examination of the right-lower field reveals rhonchi. Examination of the left-lower field reveals rhonchi. Rhonchi present.   Abdominal:      General: Bowel sounds are normal.      Palpations: Abdomen is soft.      Tenderness: There is no abdominal tenderness.   Musculoskeletal:         General: Normal range of motion.      Cervical back: Normal range of motion and neck supple.      Comments: Pelvic instability, left hip higher than right    Lymphadenopathy:      Cervical: No cervical  adenopathy.      Upper Body:      Right upper body: No supraclavicular adenopathy.      Left upper body: No supraclavicular adenopathy.   Skin:     General: Skin is warm and dry.             Comments: Scattered pink papules and scabs.    Neurological:      Mental Status: He is alert and oriented to person, place, and time.      Cranial Nerves: No cranial nerve deficit.      Sensory: No sensory deficit.      Deep Tendon Reflexes:      Reflex Scores:       Patellar reflexes are 3+ on the right side and 3+ on the left side.  Psychiatric:         Speech: Speech normal.         Behavior: Behavior normal.         Thought Content: Thought content normal.         Judgment: Judgment normal.         Vitals:    06/09/25 1229   BP: 102/62   Pulse: 60   SpO2: 98%     Body mass index is 21.43 kg/m².    .  Assessment & Plan   Diagnoses and all orders for this visit:    1. Health care maintenance (Primary)    2. Cigarette nicotine dependence without complication    3. Left hip pain  -     Ambulatory Referral to Sports Medicine    4. Rash  -     Ambulatory Referral to Dermatology    5. Rhonchi  -     XR Chest PA & Lateral; Future    6. Colon cancer screening  -     Ambulatory Referral For Screening Colonoscopy    Other orders  -     Varenicline Tartrate, Starter, 0.5 MG X 11 & 1 MG X 42 tablet therapy pack; Take 0.5 mg by mouth Daily for 3 days, THEN 0.5 mg 2 (Two) Times a Day for 4 days, THEN 1 mg 2 (Two) Times a Day for 21 days. Take 0.5 mg po daily x 3 days, then 0.5 mg po bid x 4 days, then 1 mg po bid  Dispense: 1 each; Refill: 0  -     varenicline (CHANTIX) 1 MG tablet; Take 1 tablet by mouth 2 (Two) Times a Day for 196 days.  Dispense: 56 tablet; Refill: 6                 1. Preventative counseling- continue to eat healthy and exercise   2. Current every day smoker- encouraged pt to quit smoke, he will try chantix, aware of dosing and SEs  3. Left hip pain - see sports med   4. Rash- will re place derm referral if this  becomes irritating pt will call and will send in medrol dose pack   ?hidradenitis  suppurativa   5. Coarse rhonchi- check CXR, stop smoking - if he becomes symptomatic he will get a pulm referral  6. Leukocytosis- if elevated, he will quit smoking and we will recheck 6 wks later.     Grief, suggest counseling.

## 2025-06-10 LAB
25(OH)D3+25(OH)D2 SERPL-MCNC: 39.9 NG/ML (ref 30–100)
ALBUMIN SERPL-MCNC: 4.5 G/DL (ref 4.1–5.1)
ALP SERPL-CCNC: 53 IU/L (ref 44–121)
ALT SERPL-CCNC: 22 IU/L (ref 0–44)
AST SERPL-CCNC: 26 IU/L (ref 0–40)
BASOPHILS # BLD AUTO: 0 X10E3/UL (ref 0–0.2)
BASOPHILS NFR BLD AUTO: 0 %
BILIRUB SERPL-MCNC: <0.2 MG/DL (ref 0–1.2)
BUN SERPL-MCNC: 14 MG/DL (ref 6–24)
BUN/CREAT SERPL: 18 (ref 9–20)
CALCIUM SERPL-MCNC: 9.1 MG/DL (ref 8.7–10.2)
CHLORIDE SERPL-SCNC: 104 MMOL/L (ref 96–106)
CHOLEST SERPL-MCNC: 153 MG/DL (ref 100–199)
CO2 SERPL-SCNC: 22 MMOL/L (ref 20–29)
CREAT SERPL-MCNC: 0.76 MG/DL (ref 0.76–1.27)
EGFRCR SERPLBLD CKD-EPI 2021: 112 ML/MIN/1.73
EOSINOPHIL # BLD AUTO: 0.3 X10E3/UL (ref 0–0.4)
EOSINOPHIL NFR BLD AUTO: 2 %
ERYTHROCYTE [DISTWIDTH] IN BLOOD BY AUTOMATED COUNT: 12.7 % (ref 11.6–15.4)
GLOBULIN SER CALC-MCNC: 2.2 G/DL (ref 1.5–4.5)
GLUCOSE SERPL-MCNC: 81 MG/DL (ref 70–99)
HCT VFR BLD AUTO: 41.9 % (ref 37.5–51)
HDLC SERPL-MCNC: 40 MG/DL
HGB BLD-MCNC: 13.2 G/DL (ref 13–17.7)
IMM GRANULOCYTES # BLD AUTO: 0 X10E3/UL (ref 0–0.1)
IMM GRANULOCYTES NFR BLD AUTO: 0 %
LDLC SERPL CALC-MCNC: 103 MG/DL (ref 0–99)
LDLC/HDLC SERPL: 2.6 RATIO (ref 0–3.6)
LYMPHOCYTES # BLD AUTO: 3.4 X10E3/UL (ref 0.7–3.1)
LYMPHOCYTES NFR BLD AUTO: 28 %
MCH RBC QN AUTO: 29.5 PG (ref 26.6–33)
MCHC RBC AUTO-ENTMCNC: 31.5 G/DL (ref 31.5–35.7)
MCV RBC AUTO: 94 FL (ref 79–97)
MONOCYTES # BLD AUTO: 0.8 X10E3/UL (ref 0.1–0.9)
MONOCYTES NFR BLD AUTO: 6 %
NEUTROPHILS # BLD AUTO: 7.9 X10E3/UL (ref 1.4–7)
NEUTROPHILS NFR BLD AUTO: 64 %
PLATELET # BLD AUTO: 365 X10E3/UL (ref 150–450)
POTASSIUM SERPL-SCNC: 4.9 MMOL/L (ref 3.5–5.2)
PROT SERPL-MCNC: 6.7 G/DL (ref 6–8.5)
RBC # BLD AUTO: 4.48 X10E6/UL (ref 4.14–5.8)
SODIUM SERPL-SCNC: 139 MMOL/L (ref 134–144)
TRIGL SERPL-MCNC: 46 MG/DL (ref 0–149)
TSH SERPL DL<=0.005 MIU/L-ACNC: 0.62 UIU/ML (ref 0.45–4.5)
VLDLC SERPL CALC-MCNC: 10 MG/DL (ref 5–40)
WBC # BLD AUTO: 12.5 X10E3/UL (ref 3.4–10.8)